# Patient Record
Sex: FEMALE | Race: WHITE | URBAN - METROPOLITAN AREA
[De-identification: names, ages, dates, MRNs, and addresses within clinical notes are randomized per-mention and may not be internally consistent; named-entity substitution may affect disease eponyms.]

---

## 2024-10-08 ENCOUNTER — OFFICE VISIT (OUTPATIENT)
Dept: OBGYN CLINIC | Facility: CLINIC | Age: 54
End: 2024-10-08

## 2024-10-08 VITALS — WEIGHT: 211.81 LBS | SYSTOLIC BLOOD PRESSURE: 126 MMHG | DIASTOLIC BLOOD PRESSURE: 84 MMHG

## 2024-10-08 DIAGNOSIS — D50.0 IRON DEFICIENCY ANEMIA DUE TO CHRONIC BLOOD LOSS: ICD-10-CM

## 2024-10-08 DIAGNOSIS — D21.9 FIBROID: ICD-10-CM

## 2024-10-08 DIAGNOSIS — Z01.419 ENCOUNTER FOR WELL WOMAN EXAM WITH ROUTINE GYNECOLOGICAL EXAM: ICD-10-CM

## 2024-10-08 DIAGNOSIS — N92.6 IRREGULAR MENSES: Primary | ICD-10-CM

## 2024-10-08 DIAGNOSIS — N85.2 UTERINE HYPERTROPHY: ICD-10-CM

## 2024-10-08 PROCEDURE — 99204 OFFICE O/P NEW MOD 45 MIN: CPT | Performed by: OBSTETRICS & GYNECOLOGY

## 2024-10-08 RX ORDER — ESTRADIOL 0.05 MG/D
PATCH, EXTENDED RELEASE TRANSDERMAL
COMMUNITY
Start: 2024-07-27

## 2024-10-08 RX ORDER — FERROUS SULFATE 325(65) MG
325 TABLET ORAL DAILY
COMMUNITY
Start: 2023-03-16

## 2024-10-08 RX ORDER — PROGESTERONE 100 MG/1
CAPSULE ORAL
COMMUNITY
Start: 2024-07-30

## 2024-10-08 RX ORDER — ALBUTEROL SULFATE 90 UG/1
INHALANT RESPIRATORY (INHALATION)
COMMUNITY
Start: 2024-06-08

## 2024-10-08 RX ORDER — TIRZEPATIDE 2.5 MG/.5ML
INJECTION, SOLUTION SUBCUTANEOUS
COMMUNITY
Start: 2024-09-25

## 2024-10-08 NOTE — PROGRESS NOTES
HPI:    Patient ID: Gwen Prater is a 53 year old year old female.    HPI  Gynecology problem visit  53-year-old  3 para 3 last menstrual period 2024  Has concerns regarding findings on pelvic ultrasound showing uterine fibroids.  Complains of-heavy menstrual bleeding; excessive and frequent menstruation.  Had pelvic ultrasound ordered by her PCP which showed uterine hypertrophy size 13 x 6.5 x 8.4 cm with heterogeneous myometrium and intramural mass 4 x 3 cm and 2 cm consistent with fibroid.  Endometrium 9 mm thick.  Ovaries normal.  In April of last year pelvic ultrasound showed 2.4 cm fibroid.  States that her menstrual cycles changed 2 years ago and she then developed a DVT and was treated with Eliquis which she stopped 8 months ago.  Per PCP has been treating her with estradiol patch 0.05 mg twice weekly and Prometrium 100 mg p.o. daily.  She states that she has been getting a period every month lasting 4 to 5 days and that has been improved and less heavy.  States that 3 weeks ago she had blood work done which showed that her hemoglobin improved from 7.5-9.0.  She states she has not seen a gynecologist.  Has not had endometrial biopsy.  Her family history significant for 2 aunts with breast cancer.  No family history of uterine or cervical cancer.  Last Pap test 2 years ago was normal.  Review of Systems   Constitutional: Negative.    Cardiovascular: Negative.    Gastrointestinal: Negative.    Genitourinary: Negative.    Skin: Negative.    Neurological: Negative.    Psychiatric/Behavioral: Negative.     All other systems reviewed and are negative.       Current Outpatient Medications   Medication Sig Dispense Refill    Ferrous Sulfate 325 (65 Fe) MG Oral Tab Take 1 tablet (325 mg total) by mouth daily.      progesterone 100 MG Oral Cap       MOUNJARO 2.5 MG/0.5ML Subcutaneous Solution Pen-injector       estradiol 0.05 MG/24HR Transdermal Patch Biweekly       albuterol 108 (90 Base)  MCG/ACT Inhalation Aero Soln          No past medical history on file.    No past surgical history on file.    No family history on file.    Social History     Socioeconomic History    Marital status:      Spouse name: Not on file    Number of children: Not on file    Years of education: Not on file    Highest education level: Not on file   Occupational History    Not on file   Tobacco Use    Smoking status: Never     Passive exposure: Never    Smokeless tobacco: Never   Substance and Sexual Activity    Alcohol use: Not on file    Drug use: Not on file    Sexual activity: Not on file   Other Topics Concern    Not on file   Social History Narrative    Not on file     Social Determinants of Health     Financial Resource Strain: Not on file   Food Insecurity: Not on file   Transportation Needs: Not on file   Physical Activity: Not on file   Stress: Not on file   Social Connections: Not on file   Housing Stability: Not on file       Physical Exam     Vitals: /84   Wt 211 lb 12.8 oz (96.1 kg)   LMP 09/13/2024 (Exact Date)     Constitutional: She appears well-developed and well-nourished.     Musculoskeletal: Normal range of motion of upper and lower extremities.   Neurological: She is alert and oriented x 3.   Skin: Skin is warm without pallor.  Psychiatric: Her behavior is normal. Judgment normal.  Able to communicate verbally.    Genitourinary:   Pelvic exam was performed with patient supine and chaperone present.  External genitalia- normal.  Bartholin's and [place]'s glands normal.  Urethral meatus- without lesions, mass, or discharge.  Urethra- normal without lesion, cyst, mass, or tenderness.  Vulva- normal.  Labia majora and minora without lesions.   Vagina- normal, no lesions or discharge.  Moist and well supported.  Bladder-  nontender.  No masses.  Normal support.  No evidence of cystocele,  abnormal bladder neck mobility or evident urinary incontinence.  Cervix- smooth, normal epithelium without  lesions or discharge.  No motion tenderness.   Uterus-10 weeks size, midplane and mobile adnexa-  Nontender, no masses.   Perineum- normal without lesions  Anus-  Normal appearing without lesions.    ASSESSMENT/PLAN:  Menorrhagia with irregular cycles  Uterine fibroids  Return for endometrial biopsy  Check hormones for perimenopause versus menopause.  FSH and TSH  Recommend discontinuation of estrogen patch for 2 weeks prior to labs and endometrial biopsy  Continue on Prometrium 100 mg p.o. daily.  Counseled on need to evaluate endometrium to rule out hyperplasia or neoplasm.      Orders Placed This Encounter    Ferrous Sulfate 325 (65 Fe) MG Oral Tab     Sig: Take 1 tablet (325 mg total) by mouth daily.    progesterone 100 MG Oral Cap    MOUNJARO 2.5 MG/0.5ML Subcutaneous Solution Pen-injector    estradiol 0.05 MG/24HR Transdermal Patch Biweekly    albuterol 108 (90 Base) MCG/ACT Inhalation Aero Soln       Outpatient Encounter Medications as of 10/8/2024   Medication Sig Dispense Refill    Ferrous Sulfate 325 (65 Fe) MG Oral Tab Take 1 tablet (325 mg total) by mouth daily.      progesterone 100 MG Oral Cap       MOUNJARO 2.5 MG/0.5ML Subcutaneous Solution Pen-injector       estradiol 0.05 MG/24HR Transdermal Patch Biweekly       albuterol 108 (90 Base) MCG/ACT Inhalation Aero Soln        No facility-administered encounter medications on file as of 10/8/2024.

## 2024-10-09 LAB — HPV E6+E7 MRNA CVX QL NAA+PROBE: NEGATIVE

## 2024-10-10 ENCOUNTER — MED REC SCAN ONLY (OUTPATIENT)
Dept: OBGYN CLINIC | Facility: CLINIC | Age: 54
End: 2024-10-10

## 2024-10-10 ENCOUNTER — TELEPHONE (OUTPATIENT)
Dept: OBGYN CLINIC | Facility: CLINIC | Age: 54
End: 2024-10-10

## 2024-10-10 NOTE — TELEPHONE ENCOUNTER
Patient verified name and     Patient scheduled for appointment for biopsy on . Aware of scheduling details.

## 2024-10-10 NOTE — TELEPHONE ENCOUNTER
Patient was advised to return for endometrial biopsy to rule out endometrial hyperplasia or cancer.  I do not see that she has scheduled an appt.  Also was advised blood work to determine menopause and thyroid.

## 2024-10-11 ENCOUNTER — TELEPHONE (OUTPATIENT)
Dept: OBGYN CLINIC | Facility: CLINIC | Age: 54
End: 2024-10-11

## 2024-10-11 RX ORDER — METRONIDAZOLE 500 MG/1
500 TABLET ORAL 2 TIMES DAILY
Qty: 14 TABLET | Refills: 0 | Status: SHIPPED | OUTPATIENT
Start: 2024-10-11

## 2024-10-11 NOTE — TELEPHONE ENCOUNTER
Pt name and  verified     Pt informed of results and recommendations. Pt verbalized understanding and preferred Flagyl. Flagyl order placed. Informed pt of alcohol precautions. Pt verbalized understanding and agreed.    Pt aware to call the office if symptoms do not resolve after completing treatment.

## 2024-10-11 NOTE — TELEPHONE ENCOUNTER
----- Message from Fernando Avila sent at 10/10/2024 12:35 PM CDT -----  Please inform patient that her Pap test was normal.  One of the changes noted was a shift towards bacterial venkat suggesting bacterial vaginosis.  This is an overgrowth of the normally present bacteria in the vagina.  It can be a cause of either vaginal discharge or odor.  If she wishes to be treated, we can prescribe oral antibiotics or vaginal gel.  Can Rx (assuming no allergy to metronidazole):  Flagyl 500 mg PO BID x 7 days or Metrogel Vaginal 0.75%,  one application daily for five days.  No refills.

## 2024-10-26 ENCOUNTER — LAB ENCOUNTER (OUTPATIENT)
Dept: LAB | Facility: HOSPITAL | Age: 54
End: 2024-10-26
Attending: OBSTETRICS & GYNECOLOGY
Payer: COMMERCIAL

## 2024-10-26 DIAGNOSIS — N92.6 IRREGULAR MENSES: ICD-10-CM

## 2024-10-26 LAB
FSH SERPL-ACNC: 5 MIU/ML
TSI SER-ACNC: 2.15 MIU/ML (ref 0.55–4.78)

## 2024-10-26 PROCEDURE — 83001 ASSAY OF GONADOTROPIN (FSH): CPT

## 2024-10-26 PROCEDURE — 84443 ASSAY THYROID STIM HORMONE: CPT

## 2024-10-26 PROCEDURE — 36415 COLL VENOUS BLD VENIPUNCTURE: CPT

## 2024-11-05 ENCOUNTER — OFFICE VISIT (OUTPATIENT)
Dept: OBGYN CLINIC | Facility: CLINIC | Age: 54
End: 2024-11-05
Payer: COMMERCIAL

## 2024-11-05 VITALS — DIASTOLIC BLOOD PRESSURE: 78 MMHG | SYSTOLIC BLOOD PRESSURE: 119 MMHG

## 2024-11-05 DIAGNOSIS — Z01.812 PRE-PROCEDURAL LABORATORY EXAMINATION: ICD-10-CM

## 2024-11-05 DIAGNOSIS — N85.2 UTERINE HYPERTROPHY: ICD-10-CM

## 2024-11-05 DIAGNOSIS — N92.0 EXCESSIVE OR FREQUENT MENSTRUATION: ICD-10-CM

## 2024-11-05 DIAGNOSIS — N92.6 IRREGULAR MENSES: Primary | ICD-10-CM

## 2024-11-05 LAB
CONTROL LINE PRESENT WITH A CLEAR BACKGROUND (YES/NO): YES YES/NO
KIT LOT #: NORMAL NUMERIC
PREGNANCY TEST, URINE: NEGATIVE

## 2024-11-05 PROCEDURE — 58100 BIOPSY OF UTERUS LINING: CPT | Performed by: OBSTETRICS & GYNECOLOGY

## 2024-11-05 PROCEDURE — 81025 URINE PREGNANCY TEST: CPT | Performed by: OBSTETRICS & GYNECOLOGY

## 2024-11-05 PROCEDURE — 99213 OFFICE O/P EST LOW 20 MIN: CPT | Performed by: OBSTETRICS & GYNECOLOGY

## 2024-11-05 NOTE — PROGRESS NOTES
Subjective:   Patient ID: Gwen Prater is a 53 year old female.    HPI  GYN follow-up visit  For endometrial biopsy  Blood work showed normal FSH of 5.0.  Normal TSH.  Pap test and HPV normal.  Last menstrual period October 26, 2024.  States that her LMP was improved.  She saw her doctor who did labs and her anemia has improved though still hemoglobin around 10.  Was recommended to see hematologist.  Last period lasted 8 days and was not as heavy as prior.  History/Other:   Review of Systems  Current Outpatient Medications   Medication Sig Dispense Refill    Ferrous Sulfate 325 (65 Fe) MG Oral Tab Take 1 tablet (325 mg total) by mouth daily.      progesterone 100 MG Oral Cap       MOUNJARO 2.5 MG/0.5ML Subcutaneous Solution Pen-injector       albuterol 108 (90 Base) MCG/ACT Inhalation Aero Soln        Allergies:Allergies[1]    Objective:   Physical Exam    Assessment & Plan:   1. Irregular menses    2. Uterine hypertrophy        No orders of the defined types were placed in this encounter.      Meds This Visit:  Requested Prescriptions      No prescriptions requested or ordered in this encounter       Imaging & Referrals:  None         [1] Not on File

## 2024-11-07 ENCOUNTER — TELEPHONE (OUTPATIENT)
Dept: OBGYN CLINIC | Facility: CLINIC | Age: 54
End: 2024-11-07

## 2024-11-07 RX ORDER — PROGESTERONE 200 MG/1
CAPSULE ORAL
Qty: 42 CAPSULE | Refills: 2 | Status: SHIPPED | OUTPATIENT
Start: 2024-11-07

## 2024-11-07 NOTE — TELEPHONE ENCOUNTER
----- Message from Fernando Avila sent at 11/7/2024  8:47 AM CST -----  Please inform by MyChart, call, or mail of normal endometrial biopsy.  There were no cancerous or precancerous (hyperplasia) cells.  Please follow any other recommendation that Dr. Avila may have given.  Recommend Prometrium 200 mg po x 14 days monthly.  Keep menstrual diary.  FU 6 months

## 2024-11-07 NOTE — TELEPHONE ENCOUNTER
Pt name and  verified     Pt informed of results and recommendations. Pt verbalized understanding and agreed.  Pt scheduled for f/u on 25. Pt aware of scheduling details.

## 2025-05-19 ENCOUNTER — TELEPHONE (OUTPATIENT)
Dept: OBGYN CLINIC | Facility: CLINIC | Age: 55
End: 2025-05-19

## 2025-05-19 RX ORDER — PROGESTERONE 200 MG/1
CAPSULE ORAL
Qty: 42 CAPSULE | Refills: 2 | Status: SHIPPED | OUTPATIENT
Start: 2025-05-19

## 2025-05-19 NOTE — TELEPHONE ENCOUNTER
progesterone (PROMETRIUM) 200 MG Oral Cap, Take one tablet by mouth for 14 days every month, Disp: 42 capsule, Rfl: 2

## (undated) NOTE — LETTER
AUTHORIZATION FOR SURGICAL OPERATION OR OTHER PROCEDURE    1. I hereby authorize Dr. Fernando Avila, and [facility] staff assigned to my case to perform the following operation and/or procedure at the [facility] site:    _______________________________________________________________________________________________    ENDOMETRIAL BIOPSY  _______________________________________________________________________________________________    2.  My physician has explained the nature and purpose of the operation or other procedure, possible alternative methods of treatment, the risks involved, and the possibility of complication to me.  I acknowledge that no guarantee has been made as to the result that may be obtained.  3.  I recognize that, during the course of this operation, or other procedure, unforseen conditions may necessitate additional or different procedure than those listed above.  I, therefore, further authorize and request that the above named physician, his/her physician assistants or designees perform such procedures as are, in his/her professional opinion, necessary and desirable.  4.  Any tissue or organs removed in the operation or other procedure may be disposed of by and at the discretion of the [facility] and [facility].  5.  I understand that in the event of a medical emergency, I will be transported by local paramedics to [facility] or other hospital emergency department.  6.  I certify that I have read and fully understand the above consent to operation and/or other procedure.    7.  I acknowledge that my physician has explained sedation/analgesia administration to me including the risks and benefits.  I consent to the administration of sedation/analgesia as may be necessary or desirable in the judgement of my physician.    Witness signature: ___________________________________________________ Date:  ______/______/_____                     Time:  ________ A.M.  P.M.       Patient Name:  ______________________________________________________  (please print)      Patient signature:  ___________________________________________________             Relationship to Patient:           []  Parent    Responsible person                          []  Spouse  In case of minor or                    [] Other  _____________   Incompetent name:  __________________________________________________                               (please print)      _____________      Responsible person  In case of minor or  Incompetent signature:  _______________________________________________    Statement of Physician  My signature below affirms that prior to the time of the procedure, I have explained to the patient and/or his/her guardian, the risks and benefits involved in the proposed treatment and any reasonable alternative to the proposed treatment.  I have also explained the risks and benefits involved in the refusal of the proposed treatment and have answered the patient's questions.                        Date:  ______/______/_______  Provider                      Signature:  __________________________________________________________       Time:  ___________ A.M    P.M.